# Patient Record
Sex: FEMALE | Race: WHITE | NOT HISPANIC OR LATINO
[De-identification: names, ages, dates, MRNs, and addresses within clinical notes are randomized per-mention and may not be internally consistent; named-entity substitution may affect disease eponyms.]

---

## 2020-11-05 ENCOUNTER — APPOINTMENT (OUTPATIENT)
Dept: NEUROSURGERY | Facility: CLINIC | Age: 45
End: 2020-11-05
Payer: COMMERCIAL

## 2020-11-05 VITALS
SYSTOLIC BLOOD PRESSURE: 111 MMHG | HEART RATE: 96 BPM | HEIGHT: 63 IN | WEIGHT: 181 LBS | DIASTOLIC BLOOD PRESSURE: 75 MMHG | OXYGEN SATURATION: 99 % | BODY MASS INDEX: 32.07 KG/M2 | TEMPERATURE: 98.9 F | RESPIRATION RATE: 18 BRPM

## 2020-11-05 DIAGNOSIS — Z56.0 UNEMPLOYMENT, UNSPECIFIED: ICD-10-CM

## 2020-11-05 DIAGNOSIS — Z87.820 PERSONAL HISTORY OF TRAUMATIC BRAIN INJURY: ICD-10-CM

## 2020-11-05 DIAGNOSIS — G93.5 COMPRESSION OF BRAIN: ICD-10-CM

## 2020-11-05 DIAGNOSIS — Z78.9 OTHER SPECIFIED HEALTH STATUS: ICD-10-CM

## 2020-11-05 DIAGNOSIS — M62.838 OTHER MUSCLE SPASM: ICD-10-CM

## 2020-11-05 DIAGNOSIS — F07.81 POSTCONCUSSIONAL SYNDROME: ICD-10-CM

## 2020-11-05 DIAGNOSIS — Z87.828 PERSONAL HISTORY OF OTHER (HEALED) PHYSICAL INJURY AND TRAUMA: ICD-10-CM

## 2020-11-05 DIAGNOSIS — M54.2 CERVICALGIA: ICD-10-CM

## 2020-11-05 DIAGNOSIS — Z86.59 PERSONAL HISTORY OF OTHER MENTAL AND BEHAVIORAL DISORDERS: ICD-10-CM

## 2020-11-05 DIAGNOSIS — Q79.60 EHLERS-DANLOS SYNDROME, UNSP: ICD-10-CM

## 2020-11-05 PROCEDURE — 99203 OFFICE O/P NEW LOW 30 MIN: CPT

## 2020-11-05 PROCEDURE — 99072 ADDL SUPL MATRL&STAF TM PHE: CPT

## 2020-11-05 SDOH — ECONOMIC STABILITY - INCOME SECURITY: UNEMPLOYMENT, UNSPECIFIED: Z56.0

## 2020-11-06 PROBLEM — Z86.59 HISTORY OF DEPRESSION: Status: RESOLVED | Noted: 2020-11-06 | Resolved: 2020-11-06

## 2020-11-06 PROBLEM — Z87.828 HISTORY OF MOTOR VEHICLE ACCIDENT: Status: RESOLVED | Noted: 2020-11-06 | Resolved: 2020-11-06

## 2020-11-06 PROBLEM — Q79.60 EHLERS-DANLOS SYNDROME: Status: RESOLVED | Noted: 2020-11-06 | Resolved: 2020-11-06

## 2020-11-06 PROBLEM — M54.2 NECK PAIN: Status: ACTIVE | Noted: 2020-11-06

## 2020-11-06 PROBLEM — Z56.0 UNEMPLOYED: Status: ACTIVE | Noted: 2020-11-06

## 2020-11-06 PROBLEM — F07.81 POST-CONCUSSION SYNDROME: Status: RESOLVED | Noted: 2020-11-06 | Resolved: 2020-11-06

## 2020-11-06 PROBLEM — M62.838 MUSCLE SPASMS OF NECK: Status: ACTIVE | Noted: 2020-11-06

## 2020-11-06 PROBLEM — Z87.820 HISTORY OF TRAUMATIC BRAIN INJURY: Status: RESOLVED | Noted: 2020-11-06 | Resolved: 2020-11-06

## 2020-11-06 PROBLEM — Z78.9 SOCIAL ALCOHOL USE: Status: ACTIVE | Noted: 2020-11-06

## 2020-11-06 RX ORDER — DULOXETINE HYDROCHLORIDE 60 MG/1
60 CAPSULE, DELAYED RELEASE ORAL
Refills: 0 | Status: ACTIVE | COMMUNITY

## 2020-11-06 RX ORDER — TOPIRAMATE 100 MG/1
100 TABLET, FILM COATED ORAL
Refills: 0 | Status: ACTIVE | COMMUNITY

## 2020-11-06 RX ORDER — DEXTROAMPHETAMINE SACCHARATE, AMPHETAMINE ASPARTATE, DEXTROAMPHETAMINE SULFATE, AND AMPHETAMINE SULFATE 3.75; 3.75; 3.75; 3.75 MG/1; MG/1; MG/1; MG/1
TABLET ORAL
Refills: 0 | Status: ACTIVE | COMMUNITY

## 2020-11-06 RX ORDER — TRAZODONE HYDROCHLORIDE 300 MG/1
TABLET ORAL
Refills: 0 | Status: ACTIVE | COMMUNITY

## 2020-11-06 RX ORDER — ARIPIPRAZOLE 5 MG/1
5 TABLET ORAL
Refills: 0 | Status: ACTIVE | COMMUNITY

## 2020-11-06 RX ORDER — ERENUMAB-AOOE 140 MG/ML
INJECTION, SOLUTION SUBCUTANEOUS
Refills: 0 | Status: ACTIVE | COMMUNITY

## 2020-11-06 NOTE — PHYSICAL EXAM
[General Appearance - Alert] : alert [General Appearance - In No Acute Distress] : in no acute distress [Oriented To Time, Place, And Person] : oriented to person, place, and time [Impaired Insight] : insight and judgment were intact [Person] : oriented to person [Place] : oriented to place [Time] : oriented to time [Abnormal Walk] : normal gait [Neck Appearance] : the appearance of the neck was normal [Neck Cervical Mass (___cm)] : no neck mass was observed [] : no respiratory distress [Respiration, Rhythm And Depth] : normal respiratory rhythm and effort [Apical Impulse] : the apical impulse was normal [Heart Rate And Rhythm] : heart rate was normal and rhythm regular [FreeTextEntry6] : All normal except LUE weakness [FreeTextEntry1] : decreased LUE ROM

## 2020-11-06 NOTE — DATA REVIEWED
[de-identified] : 10/9/2020 MRI brain from [facility] shows a stable Chiari 1 malformation.

## 2020-11-06 NOTE — DATA REVIEWED
[de-identified] : 10/9/2020 MRI brain from [facility] shows a stable Chiari 1 malformation.

## 2020-11-06 NOTE — ASSESSMENT
[FreeTextEntry1] : MRI shows stable Chiari Malformation s/p decompression in 2014. Neck pain most likely muscle spasm. Neck PT recommended. No surgical intervention. Follow up with Neurology.
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
## 2020-11-06 NOTE — REASON FOR VISIT
[New Patient Visit] : a new patient visit [Other: _____] : [unfilled] [FreeTextEntry1] : presents s/i Chiari decompression in 2014 with Dr. Flores,

## 2020-11-06 NOTE — REVIEW OF SYSTEMS
[As Noted in HPI] : as noted in HPI [Joint Pain] : joint pain [Negative] : Respiratory [FreeTextEntry9] : left shoulder
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
## 2024-04-01 ENCOUNTER — NON-APPOINTMENT (OUTPATIENT)
Age: 49
End: 2024-04-01

## 2024-05-12 ENCOUNTER — NON-APPOINTMENT (OUTPATIENT)
Age: 49
End: 2024-05-12

## 2024-07-11 ENCOUNTER — NON-APPOINTMENT (OUTPATIENT)
Age: 49
End: 2024-07-11

## 2025-02-08 ENCOUNTER — NON-APPOINTMENT (OUTPATIENT)
Age: 50
End: 2025-02-08